# Patient Record
Sex: FEMALE | Race: WHITE | URBAN - METROPOLITAN AREA
[De-identification: names, ages, dates, MRNs, and addresses within clinical notes are randomized per-mention and may not be internally consistent; named-entity substitution may affect disease eponyms.]

---

## 2022-02-23 ENCOUNTER — OFFICE VISIT (OUTPATIENT)
Dept: URBAN - METROPOLITAN AREA CLINIC 1 | Facility: CLINIC | Age: 77
End: 2022-02-23
Payer: MEDICARE

## 2022-02-23 DIAGNOSIS — Z96.1 PRESENCE OF INTRAOCULAR LENS: ICD-10-CM

## 2022-02-23 DIAGNOSIS — H52.4 PRESBYOPIA: ICD-10-CM

## 2022-02-23 DIAGNOSIS — H16.223 KERATOCONJUNCTIVITIS SICCA, BILATERAL: Primary | ICD-10-CM

## 2022-02-23 PROCEDURE — 99212 OFFICE O/P EST SF 10 MIN: CPT | Performed by: SPECIALIST

## 2022-02-23 ASSESSMENT — VISUAL ACUITY
OD: 20/25
OS: 20/25

## 2022-02-23 ASSESSMENT — INTRAOCULAR PRESSURE
OD: 11
OS: 11

## 2023-02-28 ENCOUNTER — OFFICE VISIT (OUTPATIENT)
Dept: URBAN - METROPOLITAN AREA CLINIC 1 | Facility: CLINIC | Age: 78
End: 2023-02-28
Payer: MEDICARE

## 2023-02-28 DIAGNOSIS — H16.223 KERATOCONJUNCTIVITIS SICCA, BILATERAL: Primary | ICD-10-CM

## 2023-02-28 DIAGNOSIS — Z96.1 PRESENCE OF INTRAOCULAR LENS: ICD-10-CM

## 2023-02-28 DIAGNOSIS — H52.4 PRESBYOPIA: ICD-10-CM

## 2023-02-28 PROCEDURE — 99213 OFFICE O/P EST LOW 20 MIN: CPT | Performed by: OPHTHALMOLOGY

## 2023-02-28 RX ORDER — LIFITEGRAST 50 MG/ML
5 % SOLUTION/ DROPS OPHTHALMIC
Qty: 60 | Refills: 2 | Status: ACTIVE
Start: 2023-02-28

## 2023-02-28 ASSESSMENT — VISUAL ACUITY
OS: 20/25
OD: 20/30

## 2023-02-28 ASSESSMENT — INTRAOCULAR PRESSURE
OS: 11
OD: 11

## 2023-02-28 NOTE — IMPRESSION/PLAN
Impression: Keratoconjunctivitis sicca, bilateral: P93.790. Plan: Dry eyes account for the patient's complaints. There is no evidence of permanent changes to the cornea. Explained condition does not have a cure. The patient failed artificial tear monotherapy. We will continue Xiidra BID OU.  Patient is still having some inflammation issues today

## 2023-02-28 NOTE — IMPRESSION/PLAN
Impression: Presence of intraocular lens: Z96.1. 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 The 70year old female presents for evaluation of Dry eyes in the right eye and left eye. The condition is described as dry. Pt stated she sees vision is uneven, per pt she has been struggling with this condition for about three years. Pt stated that if  she puts her readers to help her right eye this makes her vision blurry because OS does not need readers. We have gone over the option of Lasik touch up we do recommend iol exchange, we are strongly recommending to try specs for reading since she is happy with the reading portion.

## 2023-03-07 ENCOUNTER — OFFICE VISIT (OUTPATIENT)
Dept: URBAN - METROPOLITAN AREA CLINIC 3 | Facility: CLINIC | Age: 78
End: 2023-03-07
Payer: MEDICARE

## 2023-03-07 DIAGNOSIS — H04.123 DRY EYE SYNDROME OF BILATERAL LACRIMAL GLANDS: Primary | ICD-10-CM

## 2023-03-07 PROCEDURE — 92012 INTRM OPH EXAM EST PATIENT: CPT | Performed by: OPHTHALMOLOGY

## 2023-03-07 ASSESSMENT — KERATOMETRY
OS: 43.00
OD: 42.50

## 2023-03-07 ASSESSMENT — INTRAOCULAR PRESSURE
OS: 12
OD: 13

## 2023-03-07 NOTE — IMPRESSION/PLAN
Impression: Keratoconjunctivitis sicca, bilateral: X68.645. Plan: Dry eyes account for the patient's complaints. There is no evidence of permanent changes to the cornea. Explained condition does not have a cure. The patient failed artificial tear monotherapy. We will continue Xiidra BID OU.

## 2023-03-08 ENCOUNTER — OFFICE VISIT (OUTPATIENT)
Dept: URBAN - METROPOLITAN AREA CLINIC 3 | Facility: CLINIC | Age: 78
End: 2023-03-08
Payer: MEDICARE

## 2023-03-08 DIAGNOSIS — H53.8 OTHER VISUAL DISTURBANCES: Primary | ICD-10-CM

## 2023-03-08 ASSESSMENT — INTRAOCULAR PRESSURE
OD: 12
OS: 13

## 2023-03-08 NOTE — IMPRESSION/PLAN
Impression: Other visual disturbances: H53.8. Plan: Patient underwent successful CL trial to remove the monovision effect from the left eye with a -1.25 Sph. Contact removed today. Recommend a formal evaluation for Jerri 86 IOL touch up in the left eye only. Gave pricing information and available dates.

## 2023-03-30 ENCOUNTER — POST-OPERATIVE VISIT (OUTPATIENT)
Dept: URBAN - METROPOLITAN AREA CLINIC 4 | Facility: CLINIC | Age: 78
End: 2023-03-30

## 2023-03-30 DIAGNOSIS — Z48.810 ENCOUNTER FOR SURGICAL AFTERCARE FOLLOWING SURGERY ON A SENSE ORGAN: Primary | ICD-10-CM

## 2023-03-30 PROCEDURE — 99024 POSTOP FOLLOW-UP VISIT: CPT | Performed by: OPHTHALMOLOGY

## 2023-03-30 NOTE — IMPRESSION/PLAN
Impression: S/P PRK IOL Touch up OS - 6 Days. Encounter for surgical aftercare following surgery on a sense organ  Z48.810. Plan: POD#6 s/p PRK IOL Touch up left eye- healing well, BCL removed. Stop Ofloxacin and save for future use. Start a 3 week taper with PF 1%. Cont. Vitamin C 1,000mg PO QD for 3 weeks, cont. sunglasses protection and preservative free artificial tears Q1hour with taper of 1 hour per week. F/U in 3-4 weeks, sooner prn. --Advised patient to use artificial tears for comfort.

## 2023-04-20 ENCOUNTER — POST-OPERATIVE VISIT (OUTPATIENT)
Dept: URBAN - METROPOLITAN AREA CLINIC 4 | Facility: CLINIC | Age: 78
End: 2023-04-20

## 2023-04-20 DIAGNOSIS — Z48.810 ENCOUNTER FOR SURGICAL AFTERCARE FOLLOWING SURGERY ON A SENSE ORGAN: Primary | ICD-10-CM

## 2023-04-20 PROCEDURE — 99024 POSTOP FOLLOW-UP VISIT: CPT | Performed by: OPHTHALMOLOGY

## 2023-04-20 ASSESSMENT — INTRAOCULAR PRESSURE
OD: 12
OS: 10

## 2023-04-20 ASSESSMENT — KERATOMETRY
OD: 42.75
OS: 43.50

## 2023-04-20 NOTE — IMPRESSION/PLAN
Impression: S/P PRK IOL TOUCH UP OS - 27 Days. Encounter for surgical aftercare following surgery on a sense organ  Z48.810. Plan: POM#1 s/p PRK Touch up over IOL OS- (target was to eliminate Monovision) healing well, stop PF 1%. Patient has residual refractive error but plan to observe for now. Stop Vitamin C. Cont. sunglasses protection and artificial tears QID. RTC in 2 months for a repeat PO.

## 2023-06-27 ENCOUNTER — POST-OPERATIVE VISIT (OUTPATIENT)
Dept: URBAN - METROPOLITAN AREA CLINIC 4 | Facility: CLINIC | Age: 78
End: 2023-06-27

## 2023-06-27 DIAGNOSIS — Z48.810 ENCOUNTER FOR SURGICAL AFTERCARE FOLLOWING SURGERY ON A SENSE ORGAN: Primary | ICD-10-CM

## 2023-06-27 PROCEDURE — 99024 POSTOP FOLLOW-UP VISIT: CPT | Performed by: OPHTHALMOLOGY

## 2023-06-27 ASSESSMENT — INTRAOCULAR PRESSURE
OS: 8
OD: 12

## 2023-06-27 NOTE — IMPRESSION/PLAN
Impression: S/P PRK Touch up over IOL OS - 95 Days. Encounter for surgical aftercare following surgery on a sense organ  Z48.810. Plan: POM#3 s/p PRK touch up over IOL OS- aimed to remove monovision. AR today shows residual refractive error that inconsistent with manifested vision and most likely represents corneal remodeling. Temporary punctal plugs placed. Stop Vitamin C. Cont. sunglasses protection and artificial tears QID. F/U in 1 month for PO and refraction, sooner PRN.

## 2023-07-25 ENCOUNTER — POST-OPERATIVE VISIT (OUTPATIENT)
Dept: URBAN - METROPOLITAN AREA CLINIC 4 | Facility: CLINIC | Age: 78
End: 2023-07-25

## 2023-07-25 DIAGNOSIS — Z48.810 ENCOUNTER FOR SURGICAL AFTERCARE FOLLOWING SURGERY ON A SENSE ORGAN: Primary | ICD-10-CM

## 2023-07-25 DIAGNOSIS — H52.13 MYOPIA, BILATERAL: ICD-10-CM

## 2023-07-25 PROCEDURE — 99024 POSTOP FOLLOW-UP VISIT: CPT | Performed by: OPHTHALMOLOGY

## 2023-07-25 ASSESSMENT — INTRAOCULAR PRESSURE
OS: 12
OD: 13

## 2023-07-25 ASSESSMENT — VISUAL ACUITY: OS: 20/25

## 2023-07-25 ASSESSMENT — KERATOMETRY
OS: 43.25
OD: 42.50

## 2023-11-27 ENCOUNTER — POST-OPERATIVE VISIT (OUTPATIENT)
Dept: URBAN - METROPOLITAN AREA CLINIC 4 | Facility: CLINIC | Age: 78
End: 2023-11-27

## 2023-11-27 DIAGNOSIS — Z48.810 ENCOUNTER FOR SURGICAL AFTERCARE FOLLOWING SURGERY ON THE SENSE ORGANS: Primary | ICD-10-CM

## 2023-11-27 PROCEDURE — 99024 POSTOP FOLLOW-UP VISIT: CPT | Performed by: OPHTHALMOLOGY

## 2023-11-27 ASSESSMENT — INTRAOCULAR PRESSURE
OD: 13
OS: 12

## 2023-11-27 ASSESSMENT — KERATOMETRY
OS: 43.75
OD: 43.00

## 2024-02-08 ENCOUNTER — REFRACTIVE (OUTPATIENT)
Dept: URBAN - METROPOLITAN AREA CLINIC 6 | Facility: CLINIC | Age: 79
End: 2024-02-08

## 2024-02-08 ENCOUNTER — POST-OPERATIVE VISIT (OUTPATIENT)
Dept: URBAN - METROPOLITAN AREA CLINIC 4 | Facility: CLINIC | Age: 79
End: 2024-02-08

## 2024-02-08 DIAGNOSIS — H53.8 OTHER VISUAL DISTURBANCES: Primary | ICD-10-CM

## 2024-02-08 DIAGNOSIS — H52.13 MYOPIA, BILATERAL: Primary | ICD-10-CM

## 2024-02-08 PROCEDURE — 99024 POSTOP FOLLOW-UP VISIT: CPT | Performed by: OPHTHALMOLOGY

## 2024-02-08 PROCEDURE — 92015 DETERMINE REFRACTIVE STATE: CPT | Performed by: OPHTHALMOLOGY

## 2024-02-08 ASSESSMENT — KERATOMETRY
OS: 43.63
OD: 43.00

## 2024-03-07 ENCOUNTER — POST-OPERATIVE VISIT (OUTPATIENT)
Dept: URBAN - METROPOLITAN AREA CLINIC 4 | Facility: CLINIC | Age: 79
End: 2024-03-07

## 2024-03-07 DIAGNOSIS — Z48.810 ENCOUNTER FOR SURGICAL AFTERCARE FOLLOWING SURGERY ON A SENSE ORGAN: Primary | ICD-10-CM

## 2024-03-07 PROCEDURE — 99024 POSTOP FOLLOW-UP VISIT: CPT | Performed by: OPHTHALMOLOGY

## 2024-04-03 ENCOUNTER — POST-OPERATIVE VISIT (OUTPATIENT)
Dept: URBAN - METROPOLITAN AREA CLINIC 4 | Facility: CLINIC | Age: 79
End: 2024-04-03

## 2024-04-03 DIAGNOSIS — Z48.810 ENCOUNTER FOR SURGICAL AFTERCARE FOLLOWING SURGERY ON A SENSE ORGAN: Primary | ICD-10-CM

## 2024-04-03 PROCEDURE — 99024 POSTOP FOLLOW-UP VISIT: CPT | Performed by: OPHTHALMOLOGY

## 2024-04-03 ASSESSMENT — KERATOMETRY
OD: 42.50
OS: 41.75

## 2024-07-03 ENCOUNTER — POST-OPERATIVE VISIT (OUTPATIENT)
Dept: URBAN - METROPOLITAN AREA CLINIC 4 | Facility: CLINIC | Age: 79
End: 2024-07-03

## 2024-07-03 DIAGNOSIS — Z48.810 ENCOUNTER FOR SURGICAL AFTERCARE FOLLOWING SURGERY ON A SENSE ORGAN: Primary | ICD-10-CM

## 2024-07-03 PROCEDURE — 99024 POSTOP FOLLOW-UP VISIT: CPT | Performed by: OPHTHALMOLOGY

## 2024-07-03 RX ORDER — LIFITEGRAST 50 MG/ML
5 % SOLUTION/ DROPS OPHTHALMIC
Qty: 180 | Refills: 4 | Status: ACTIVE
Start: 2024-07-03

## 2024-07-03 ASSESSMENT — INTRAOCULAR PRESSURE
OD: 12
OS: 11

## 2025-07-07 ENCOUNTER — OFFICE VISIT (OUTPATIENT)
Dept: URBAN - METROPOLITAN AREA CLINIC 3 | Facility: CLINIC | Age: 80
End: 2025-07-07
Payer: MEDICARE

## 2025-07-07 DIAGNOSIS — H02.834 DERMATOCHALASIS OF LEFT UPPER EYELID: ICD-10-CM

## 2025-07-07 DIAGNOSIS — H02.831 DERMATOCHALASIS OF RIGHT UPPER EYELID: ICD-10-CM

## 2025-07-07 DIAGNOSIS — H04.123 DRY EYE SYNDROME OF BILATERAL LACRIMAL GLANDS: Primary | ICD-10-CM

## 2025-07-07 DIAGNOSIS — Z48.810 ENCOUNTER FOR SURGICAL AFTERCARE FOLLOWING SURGERY ON A SENSE ORGAN: ICD-10-CM

## 2025-07-07 DIAGNOSIS — H43.313 VITREOUS MEMBRANES AND STRANDS, BILATERAL: ICD-10-CM

## 2025-07-07 DIAGNOSIS — H52.13 MYOPIA, BILATERAL: ICD-10-CM

## 2025-07-07 PROCEDURE — 99214 OFFICE O/P EST MOD 30 MIN: CPT | Performed by: OPHTHALMOLOGY

## 2025-07-07 ASSESSMENT — INTRAOCULAR PRESSURE
OS: 9
OD: 12

## 2025-07-07 ASSESSMENT — KERATOMETRY
OD: 42.88
OS: 42.25